# Patient Record
Sex: MALE | Race: BLACK OR AFRICAN AMERICAN | Employment: FULL TIME | URBAN - METROPOLITAN AREA
[De-identification: names, ages, dates, MRNs, and addresses within clinical notes are randomized per-mention and may not be internally consistent; named-entity substitution may affect disease eponyms.]

---

## 2023-03-04 ENCOUNTER — HOSPITAL ENCOUNTER (EMERGENCY)
Facility: HOSPITAL | Age: 43
Discharge: HOME | End: 2023-03-04
Attending: EMERGENCY MEDICINE
Payer: COMMERCIAL

## 2023-03-04 VITALS
SYSTOLIC BLOOD PRESSURE: 143 MMHG | HEART RATE: 74 BPM | TEMPERATURE: 98 F | OXYGEN SATURATION: 100 % | DIASTOLIC BLOOD PRESSURE: 89 MMHG | RESPIRATION RATE: 18 BRPM

## 2023-03-04 DIAGNOSIS — N30.90 CYSTITIS: Primary | ICD-10-CM

## 2023-03-04 LAB
ALBUMIN SERPL-MCNC: 3.3 G/DL (ref 3.4–5)
ALBUMIN/GLOB SERPL: 0.7 {RATIO} (ref 1–2)
ALP LIVER SERPL-CCNC: 64 U/L
ALT SERPL-CCNC: 38 U/L
ANION GAP SERPL CALC-SCNC: 4 MMOL/L (ref 0–18)
AST SERPL-CCNC: 32 U/L (ref 15–37)
BASOPHILS # BLD AUTO: 0.03 X10(3) UL (ref 0–0.2)
BASOPHILS NFR BLD AUTO: 0.7 %
BILIRUB SERPL-MCNC: 0.2 MG/DL (ref 0.1–2)
BILIRUB UR QL STRIP.AUTO: NEGATIVE
BUN BLD-MCNC: 10 MG/DL (ref 7–18)
CALCIUM BLD-MCNC: 9 MG/DL (ref 8.5–10.1)
CHLORIDE SERPL-SCNC: 106 MMOL/L (ref 98–112)
CO2 SERPL-SCNC: 29 MMOL/L (ref 21–32)
COLOR UR AUTO: YELLOW
CREAT BLD-MCNC: 1.04 MG/DL
EOSINOPHIL # BLD AUTO: 0.03 X10(3) UL (ref 0–0.7)
EOSINOPHIL NFR BLD AUTO: 0.7 %
ERYTHROCYTE [DISTWIDTH] IN BLOOD BY AUTOMATED COUNT: 13.3 %
GFR SERPLBLD BASED ON 1.73 SQ M-ARVRAT: 93 ML/MIN/1.73M2 (ref 60–?)
GLOBULIN PLAS-MCNC: 4.9 G/DL (ref 2.8–4.4)
GLUCOSE BLD-MCNC: 117 MG/DL (ref 70–99)
GLUCOSE UR STRIP.AUTO-MCNC: NEGATIVE MG/DL
HCT VFR BLD AUTO: 44.2 %
HGB BLD-MCNC: 14.2 G/DL
IMM GRANULOCYTES # BLD AUTO: 0.02 X10(3) UL (ref 0–1)
IMM GRANULOCYTES NFR BLD: 0.5 %
KETONES UR STRIP.AUTO-MCNC: NEGATIVE MG/DL
LYMPHOCYTES # BLD AUTO: 1.4 X10(3) UL (ref 1–4)
LYMPHOCYTES NFR BLD AUTO: 32 %
MCH RBC QN AUTO: 25.5 PG (ref 26–34)
MCHC RBC AUTO-ENTMCNC: 32.1 G/DL (ref 31–37)
MCV RBC AUTO: 79.5 FL
MONOCYTES # BLD AUTO: 0.69 X10(3) UL (ref 0.1–1)
MONOCYTES NFR BLD AUTO: 15.8 %
NEUTROPHILS # BLD AUTO: 2.2 X10 (3) UL (ref 1.5–7.7)
NEUTROPHILS # BLD AUTO: 2.2 X10(3) UL (ref 1.5–7.7)
NEUTROPHILS NFR BLD AUTO: 50.3 %
NITRITE UR QL STRIP.AUTO: NEGATIVE
OSMOLALITY SERPL CALC.SUM OF ELEC: 288 MOSM/KG (ref 275–295)
PH UR STRIP.AUTO: 6 [PH] (ref 5–8)
PLATELET # BLD AUTO: 284 10(3)UL (ref 150–450)
POTASSIUM SERPL-SCNC: 3.9 MMOL/L (ref 3.5–5.1)
PROT SERPL-MCNC: 8.2 G/DL (ref 6.4–8.2)
PROT UR STRIP.AUTO-MCNC: NEGATIVE MG/DL
RBC # BLD AUTO: 5.56 X10(6)UL
RBC UR QL AUTO: NEGATIVE
SODIUM SERPL-SCNC: 139 MMOL/L (ref 136–145)
SP GR UR STRIP.AUTO: 1.02 (ref 1–1.03)
UROBILINOGEN UR STRIP.AUTO-MCNC: 2 MG/DL
WBC # BLD AUTO: 4.4 X10(3) UL (ref 4–11)

## 2023-03-04 PROCEDURE — 87591 N.GONORRHOEAE DNA AMP PROB: CPT | Performed by: EMERGENCY MEDICINE

## 2023-03-04 PROCEDURE — 81001 URINALYSIS AUTO W/SCOPE: CPT | Performed by: EMERGENCY MEDICINE

## 2023-03-04 PROCEDURE — 99283 EMERGENCY DEPT VISIT LOW MDM: CPT

## 2023-03-04 PROCEDURE — 81001 URINALYSIS AUTO W/SCOPE: CPT

## 2023-03-04 PROCEDURE — 87491 CHLMYD TRACH DNA AMP PROBE: CPT | Performed by: EMERGENCY MEDICINE

## 2023-03-04 PROCEDURE — 87086 URINE CULTURE/COLONY COUNT: CPT | Performed by: EMERGENCY MEDICINE

## 2023-03-04 PROCEDURE — 85025 COMPLETE CBC W/AUTO DIFF WBC: CPT | Performed by: EMERGENCY MEDICINE

## 2023-03-04 PROCEDURE — 36415 COLL VENOUS BLD VENIPUNCTURE: CPT

## 2023-03-04 PROCEDURE — 99284 EMERGENCY DEPT VISIT MOD MDM: CPT

## 2023-03-04 PROCEDURE — 80053 COMPREHEN METABOLIC PANEL: CPT | Performed by: EMERGENCY MEDICINE

## 2023-03-04 RX ORDER — CEPHALEXIN 500 MG/1
500 CAPSULE ORAL 4 TIMES DAILY
Qty: 40 CAPSULE | Refills: 0 | Status: SHIPPED | OUTPATIENT
Start: 2023-03-04 | End: 2023-03-14

## 2023-03-04 NOTE — ED INITIAL ASSESSMENT (HPI)
A&Ox3 ambulatory patient p/w fever    Endorses fever, dark urine output, HA x2 days    Today endorses having hematuria \"it was straight blood no urine\" also reports dysuria    RR even/NL    States taking leftover ABx PTA, unsure what ABx

## 2023-03-06 LAB
C TRACH DNA SPEC QL NAA+PROBE: NEGATIVE
N GONORRHOEA DNA SPEC QL NAA+PROBE: NEGATIVE

## 2024-07-30 ENCOUNTER — APPOINTMENT (OUTPATIENT)
Dept: GENERAL RADIOLOGY | Facility: HOSPITAL | Age: 44
End: 2024-07-30
Attending: EMERGENCY MEDICINE
Payer: COMMERCIAL

## 2024-07-30 ENCOUNTER — HOSPITAL ENCOUNTER (EMERGENCY)
Facility: HOSPITAL | Age: 44
Discharge: HOME | End: 2024-07-30
Attending: EMERGENCY MEDICINE
Payer: COMMERCIAL

## 2024-07-30 ENCOUNTER — APPOINTMENT (OUTPATIENT)
Dept: ULTRASOUND IMAGING | Facility: HOSPITAL | Age: 44
End: 2024-07-30
Payer: COMMERCIAL

## 2024-07-30 VITALS
WEIGHT: 230 LBS | BODY MASS INDEX: 36.1 KG/M2 | DIASTOLIC BLOOD PRESSURE: 69 MMHG | HEART RATE: 68 BPM | OXYGEN SATURATION: 98 % | HEIGHT: 67 IN | SYSTOLIC BLOOD PRESSURE: 135 MMHG | TEMPERATURE: 99 F | RESPIRATION RATE: 16 BRPM

## 2024-07-30 DIAGNOSIS — S83.92XA SPRAIN OF LEFT KNEE, UNSPECIFIED LIGAMENT, INITIAL ENCOUNTER: Primary | ICD-10-CM

## 2024-07-30 PROCEDURE — 99284 EMERGENCY DEPT VISIT MOD MDM: CPT

## 2024-07-30 PROCEDURE — 93971 EXTREMITY STUDY: CPT

## 2024-07-30 PROCEDURE — 73562 X-RAY EXAM OF KNEE 3: CPT | Performed by: EMERGENCY MEDICINE

## 2024-07-30 NOTE — ED INITIAL ASSESSMENT (HPI)
Patient complains of left leg pain and swelling for approx. 1 week. Patient states swelling occurs \"after I start moving around a lot.\" Patient is a . No SOB, dizziness, or c/p noted.

## 2024-07-30 NOTE — ED PROVIDER NOTES
Patient Seen in: [facility] Emergency Department      History     Chief Complaint   Patient presents with    Leg Pain     Stated Complaint: Left leg pain/swelling    Subjective:   HPI    This is a 44-year-old male who arrives here with complaints of left knee pain and swelling for approximately 1 week.  He states it the pain is worse when he tries to weight-bear and he states the swelling actually goes down when he elevates the leg.  He states most the pain is in the knee itself.  He denies any chest pain shortness of breath or history of blood clots denies any fevers numbness weakness dizziness lightheadedness no other specific complaints..    Objective:   History reviewed. No pertinent past medical history.           History reviewed. No pertinent surgical history.             Social History     Socioeconomic History    Marital status: Single   Tobacco Use    Smoking status: Never    Smokeless tobacco: Never   Vaping Use    Vaping status: Never Used   Substance and Sexual Activity    Alcohol use: Yes     Comment: Socially    Drug use: Never     Social Determinants of Health      Received from [facility]              Review of Systems    Positive for stated Chief Complaint: Leg Pain    Other systems are as noted in HPI.  Constitutional and vital signs reviewed.      All other systems reviewed and negative except as noted above.    Physical Exam     ED Triage Vitals [07/30/24 1623]   /81   Pulse 85   Resp 18   Temp 98.7 °F (37.1 °C)   Temp src Oral   SpO2 98 %   O2 Device None (Room air)       Current Vitals:   Vital Signs  BP: 135/69  Pulse: 68  Resp: 16  Temp: 98.7 °F (37.1 °C)  Temp src: Oral  MAP (mmHg): 89    Oxygen Therapy  SpO2: 98 %  O2 Device: None (Room air)            Physical Exam    General: Patient is in no respiratory distress.  The patient is in no respiratory distress    HEENT: There is no signs of trauma.  Oral mucosa is wet.    Lungs: Clear to auscultation without  wheezing or retractions    Cardiovascular: Regular without murmurs    Extremities: Good pulses bilaterally.  Left calf is nontender.  The left knee is mildly tender over the left knee.  That is mildly tender.  There is no findings of any septic joint.  He is able to ambulate but it does have some pain with ambulation.  There is no redness, cellulitis noted there is good pulses since exam is normal patient is otherwise neurovascular tact.    Neuro: Alert and oriented.  The patient is moving all extremities there is no focal findings.    ED Course   Labs Reviewed - No data to display          Workup was done to rule out fracture, dislocation, DVT.         MDM      I personally reviewed the radiographs and my individual interpretation shows    No obvious fracture, dislocation noted of the knee.    Also reviewed official report and it shows      XR KNEE (3 VIEWS), LEFT (CPT=73562)    Result Date: 7/30/2024  PROCEDURE:  XR KNEE ROUTINE (3 VIEWS), LEFT (CPT=73562)  TECHNIQUE:  Three views were obtained including patellar view.  COMPARISON:  None.  INDICATIONS:  PATIENT STATED HISTORY: (As transcribed by Technologist)  Pt. with left knee pain, swelling, and pressure .     FINDINGS:  BONES:  Osseous structures are intact.  No significant arthropathy. SOFT TISSUES:  Mild soft tissue swelling. EFFUSION:  Small suprapatellar joint effusion.            CONCLUSION:  1. Osseous structures are intact. 2. Small suprapatellar joint effusion.   LOCATION:  [facility]   Dictated by (CST): Lakeisha Matos MD on 7/30/2024 at 7:56 PM     Finalized by (CST): Lakeisha Matos MD on 7/30/2024 at 7:57 PM       US VENOUS DOPPLER LEG LEFT - DIAG IMG (CPT=93971)    Result Date: 7/30/2024  PROCEDURE:  VENOUS US EXTREMITY UNILATERAL LOWER  COMPARISON:  None.  INDICATIONS:  eval for DVT.  Left leg pain and swelling  TECHNIQUE:  Real time, grey scale, and duplex ultrasound was used to evaluate the lower extremity venous system. B-mode two-dimensional images of the  vascular structures, Doppler spectral analysis, and color flow.  Doppler imaging were performed.  The following veins were imaged:  Common, deep, and superficial femoral, popliteal, sapheno-femoral junction, posterior tibial veins, and the contralateral common femoral vein.     PATIENT STATED HISTORY: (As transcribed by Technologist)   US VENOUS DOPPLER LEG LEFT - DIAG IMG (CPT=93971)  FINDINGS:  EXTREMITY EXAMINED:  Left leg SAPHENOFEMORAL JUNCTION:  No reflux. THROMBI:  None visible. COMPRESSION:  Normal compressibility, phasicity, and augmentation.            CONCLUSION:  No evidence of DVT in the left leg.   LOCATION:  [id number]    Dictated by (CST): Siena Quach MD on 7/30/2024 at 5:15 PM     Finalized by (CST): Siena Quach MD on 7/30/2024 at 5:16 PM                     The patient is neurologically intact at this present time but he does have some difficulty when he does have crutches of his own I recommend that we put an Ace bandage on him ice Motrin, Tylenol and recommend close follow-up with his orthopedic surgeon there is no obvious findings of DVT there is no findings of septic joint.  But I discussed that could be ligamental injury as not seen on her workup and I do recommend he still follow-up with his primary MD for orthopedic referral.  He verbalized understanding agreed treatment plan.              Medical Decision Making      Disposition and Plan     Clinical Impression:  1. Sprain of left knee, unspecified ligament, initial encounter         Disposition:  Discharge  7/30/2024  8:05 pm    Follow-up:  Lida Suresh MD  [address] [facility] [address]  [facility] [address] [phone]    Follow up in 3 day(s)      Franki Ratliff MD  [address]   [address]  [facility] [id number] [phone]    Follow up in 2 day(s)            Medications Prescribed:  There are no discharge medications for this patient.

## 2024-07-31 NOTE — DISCHARGE INSTRUCTIONS
, Tylenol follow-up with your primary MD for orthopedic referral use the crutches that you have at home.    You were seen in the emergency room in a limited time.  There is a possibility that although we do not see any acute process at this present time that things can change with time.  Is therefore imperative that you follow-up with primary care physician for close follow-up.  If there is any significant progression of your pain  or other symptoms you to return immediately to the emergency room.